# Patient Record
Sex: FEMALE | Race: WHITE | NOT HISPANIC OR LATINO
[De-identification: names, ages, dates, MRNs, and addresses within clinical notes are randomized per-mention and may not be internally consistent; named-entity substitution may affect disease eponyms.]

---

## 2022-03-07 ENCOUNTER — RESULT REVIEW (OUTPATIENT)
Age: 72
End: 2022-03-07

## 2022-04-07 ENCOUNTER — APPOINTMENT (OUTPATIENT)
Dept: ORTHOPEDIC SURGERY | Facility: CLINIC | Age: 72
End: 2022-04-07
Payer: MEDICARE

## 2022-04-07 VITALS — BODY MASS INDEX: 41.54 KG/M2 | HEIGHT: 61 IN | WEIGHT: 220 LBS

## 2022-04-07 PROCEDURE — 99024 POSTOP FOLLOW-UP VISIT: CPT

## 2022-04-07 NOTE — PHYSICAL EXAM
[Normal Coordination] : normal coordination [Normal Sensation] : normal sensation [Normal Mood and Affect] : normal mood and affect [Orientated] : orientated [Normal Skin] : normal skin [Well Developed] : well developed [Peripheral vascular exam is grossly normal] : peripheral vascular exam is grossly normal [Left] : left shoulder [Standing] : standing [Mild] : mild [4 ___] : forward flexion 4[unfilled]/5 [4___] : internal rotation 4[unfilled]/5 [] : no tenderness at lateral shoulder [FreeTextEntry3] : Single small suture spitting from incision. Removed easily. No Active drainage. [TWNoteComboBox7] : active forward flexion 60 degrees [de-identified] : active abduction 90 degrees

## 2022-04-07 NOTE — HISTORY OF PRESENT ILLNESS
[de-identified] : The patient is s/p one month from surgery. She is doing well postoperatively. She denies new trauma. She reports a small suture spitting at incision without any drainage or redness. She denies fever, chills, SOB, CP. The patient can reach to shoulder height in FE and ABD. She will d/c her brace altogether at this time. She denies paresthesias. The patient will begin PT.

## 2022-04-07 NOTE — DISCUSSION/SUMMARY
[de-identified] : The patient is s/p left shoulder reverse total shoulder replacement with proximal biceps tenodesis\par on 3/7/22.\par \par The patient is doing well postop. \par She denies new trauma. \par She will d/c brace full time. \par The patient will begin PT and was given an rx.\par She will follow up in 4 weeks with Dr. Oconnor.

## 2022-04-07 NOTE — REASON FOR VISIT
[FreeTextEntry2] : The patient is s/p left shoulder reverse total shoulder replacement with proximal biceps tenodesis\par on 3/7/22.

## 2022-04-25 ENCOUNTER — APPOINTMENT (OUTPATIENT)
Dept: ORTHOPEDIC SURGERY | Facility: CLINIC | Age: 72
End: 2022-04-25
Payer: MEDICARE

## 2022-04-25 VITALS — WEIGHT: 225 LBS | HEIGHT: 60 IN | BODY MASS INDEX: 44.17 KG/M2

## 2022-04-25 DIAGNOSIS — M17.12 UNILATERAL PRIMARY OSTEOARTHRITIS, LEFT KNEE: ICD-10-CM

## 2022-04-25 DIAGNOSIS — M17.11 UNILATERAL PRIMARY OSTEOARTHRITIS, RIGHT KNEE: ICD-10-CM

## 2022-04-25 PROCEDURE — 99213 OFFICE O/P EST LOW 20 MIN: CPT | Mod: 25

## 2022-04-25 PROCEDURE — 20610 DRAIN/INJ JOINT/BURSA W/O US: CPT | Mod: 50

## 2022-04-25 NOTE — ASSESSMENT
[FreeTextEntry1] : 6/21/21: Xray of the left shoulder reveals mod GH OA with mod sized inferior humeral head spur.\par Xray of B/L knees reveals endstage medial OA\par Discussed CSI injection for the left shoulder today. \par Regarding the knee, Hx of knee OA, receives gel injections at outside facility, PT reports she has to wait until July until she can repeat series. Unaware of brand of gel injections, \par \par Euflexxa #3 B/L knees, patient tolerated well, post injection instructions given.\par \par Not voicing knee pain today, primarily with worsening left shoulder pain, Re-discussed OA in the left shoulder, and discussed consulting with Dr. Chaparro for possible surgical intervention. \par PT interested in CSI injection left shoulder, PT tolerated well. \par \par 1/10/22: Euflexxa #1 given today b/l knees, tolerated well. Post injection instructions. RTO one week to continue series.\par Scheduled end of February for TSA with Dr. Chaparro.\par \par 1/17/22: Euflexxa #2 given today b/l knees, tolerated well. Post injection instructions. RTO one week to continue series.\par \par 1/24/22: Euflexxa #3 given today b/l knees, tolerated well. Post injection instructions. RTO in 6 weeks if needed.\par \par 4/25/22: pervious series proved approx 7 months of relief. \par Latest series of visco with 4 months relief. \par Patient elected to proceed with steroid injection for b/l knees. \par Apply ice to affected area.\par Advised of duration btwn injections and TKA. \par R knee worse than left. \par \par \par \par

## 2022-04-25 NOTE — PROCEDURE
[Large Joint Injection] : Large joint injection [Bilateral] : bilaterally of the [Knee] : knee [Pain] : pain [X-ray evidence of Osteoarthritis on this or prior visit] : x-ray evidence of Osteoarthritis on this or prior visit [Alcohol] : alcohol [Betadine] : betadine [Ethyl Chloride sprayed topically] : ethyl chloride sprayed topically [Sterile technique used] : sterile technique used [___ cc    6mg] :  Betamethasone (Celestone) ~Vcc of 6mg [___ cc    1%] : Lidocaine ~Vcc of 1%  [] : Patient tolerated procedure well [Call if redness, pain or fever occur] : call if redness, pain or fever occur [Apply ice for 15min out of every hour for the next 12-24 hours as tolerated] : apply ice for 15 minutes out of every hour for the next 12-24 hours as tolerated [Patient was advised to rest the joint(s) for ____ days] : patient was advised to rest the joint(s) for [unfilled] days [Previous OTC use and PT nontherapeutic] : patient has tried OTC's including aspirin, Ibuprofen, Aleve, etc or prescription NSAIDS, and/or exercises at home and/or physical therapy without satisfactory response [Patient had decreased mobility in the joint] : patient had decreased mobility in the joint [Risks, benefits, alternatives discussed / Verbal consent obtained] : the risks benefits, and alternatives have been discussed, and verbal consent was obtained

## 2022-04-25 NOTE — HISTORY OF PRESENT ILLNESS
[10] : 10 [8] : 8 [Tightness] : tightness [Rest] : rest [Walking] : walking [Retired] : Work status: retired [de-identified] : last note: 1/24/22: Euflexxa #3 bilateral knees.\par 1/17/22 2nd euflexxa injection B/L knees\par 10/4/21: PT present to f/u on the left shoulder. PT reports on Tylenol arthritis that does help with pain, PT reports\par posterior and anterior shoulder pain. PT reports last CSI injections provided relief for a few days, PT is interested in\par repeating the injection.\par 7/26/21: PT present for 3rd B/L knees EUFLEXXA.\par 7/19/21: PT present for 2nd B/L EUFLEXXA.\par 7/12/21: PT present to f/u on B/L knees, PT reports she obtained the name of brand of series for gel injection\par 06/21/2021: 69YO female patient present for left shoulder pain, and limited ROM. PT reports unable to comb her hair, PT\par reports her rheumatologist advised her of shoulder OA. PT reports nocturnal awakening and difficulty sleeping. Hx of B/L\par knee OA.\par 6 Injection: [] : no [FreeTextEntry1] : bilat knee ; right worse than the left [FreeTextEntry9] : voltaren [de-identified] : has had injections in the past, last series wore off after 4 months

## 2022-04-25 NOTE — PHYSICAL EXAM
[Orientated] : orientated [Able to Communicate] : able to communicate [Normal Skin] : normal skin [Bilateral] : knee bilaterally [NL (0)] : extension 0 degrees [] : no ecchymosis [TWNoteComboBox7] : flexion 120 degrees [de-identified] : extension 0 degrees

## 2022-04-25 NOTE — DISCUSSION/SUMMARY
[de-identified] : The documentation recorded by the scribe accurately reflects the service I personally performed and the decisions made by me\par I, Koffi Miller, attest that this documentation has been prepared under the direction and in the presence of Provider Porfirio Limon MD.\par \par The patient was seen by Porfirio Limon MD\par

## 2022-04-25 NOTE — REASON FOR VISIT
[FreeTextEntry2] : 4/25/22: 4 months relief with latest visco injections.Typically last for 7 months. Intersted in b/l steroid injection. follow up for knee pain

## 2022-05-05 ENCOUNTER — TRANSCRIPTION ENCOUNTER (OUTPATIENT)
Age: 72
End: 2022-05-05

## 2022-05-05 ENCOUNTER — APPOINTMENT (OUTPATIENT)
Dept: ORTHOPEDIC SURGERY | Facility: CLINIC | Age: 72
End: 2022-05-05
Payer: MEDICARE

## 2022-05-05 VITALS — HEIGHT: 60 IN | WEIGHT: 221 LBS | BODY MASS INDEX: 43.39 KG/M2

## 2022-05-05 DIAGNOSIS — M54.6 PAIN IN THORACIC SPINE: ICD-10-CM

## 2022-05-05 DIAGNOSIS — S22.050A WEDGE COMPRESSION FRACTURE OF T5-T6 VERTEBRA, INITIAL ENCOUNTER FOR CLOSED FRACTURE: ICD-10-CM

## 2022-05-05 DIAGNOSIS — M48.10 ANKYLOSING HYPEROSTOSIS [FORESTIER], SITE UNSPECIFIED: ICD-10-CM

## 2022-05-05 PROCEDURE — 99213 OFFICE O/P EST LOW 20 MIN: CPT | Mod: 24

## 2022-05-05 PROCEDURE — 72070 X-RAY EXAM THORAC SPINE 2VWS: CPT

## 2022-05-05 NOTE — REASON FOR VISIT
[Spouse] : spouse [FreeTextEntry2] : The patient is s/p left shoulder reverse total shoulder replacement with proximal biceps tenodesis\par on 3/7/22.

## 2022-05-05 NOTE — DISCUSSION/SUMMARY
[de-identified] : The patient is s/p left shoulder reverse total shoulder replacement with proximal biceps tenodesis\par on 3/7/22.\par \par The patient is doing well postop, despite recent fall.\par No damage to the shoulder is noted on x-rays since the fall\par The patient will proceed with PT for the left shoulder and to also include a balance assessment \par She will follow up in 4 weeks with Dr. Oconnor.\par \par For the thoracic spine:\par The patient has evidence of fracture of the thoracic spine T5.\par She was prescribed a stand up MRI of the thoracic spine to rule out spine fracture\par She was referred to Dr. Lynn to review the MRI and proceed with indicated treatment for the spine\par

## 2022-05-05 NOTE — PHYSICAL EXAM
[Normal Coordination] : normal coordination [Normal Sensation] : normal sensation [Normal Mood and Affect] : normal mood and affect [Orientated] : orientated [Normal Skin] : normal skin [Well Developed] : well developed [Peripheral vascular exam is grossly normal] : peripheral vascular exam is grossly normal [Standing] : standing [Mild] : mild [4 ___] : forward flexion 4[unfilled]/5 [4___] : internal rotation 4[unfilled]/5 [Left] : left shoulder [Bilateral] : rib bilaterally [FreeTextEntry8] : midline thoracic back pain [FreeTextEntry9] : not assessed secondary to pain and recent injury [] : no tenderness at lateral shoulder [FreeTextEntry3] : Single small suture spitting from incision. Removed easily. No Active drainage. [FreeTextEntry1] : city MD 5/3/22: TSR hardware intact. no fracture in humerus or glenoid. concentrically reduced shoulder.  [TWNoteComboBox7] : active forward flexion 60 degrees [de-identified] : active abduction 90 degrees

## 2022-05-05 NOTE — HISTORY OF PRESENT ILLNESS
[de-identified] : The patient is s/p 2 months from surgery. She is doing well postoperatively for her shoulder, she has weakness of the shoulder.  The patient has had 2 recent falls. Tuesday she fell going into her house, she tripped over her cane. She did not injury the left shoulder, she hurt her thoracic spine. She was seen at urgent care and diagnosed with bruised ribs.  Last week she tripped on a runner and fell striking her head. She had a proximal suture that fell off and had a superficial suture abscess. It was treated with antibiotic ointment. She denies fever, chills, SOB, CP. No drainage from the surgical site or from the superficial abscess. She denies paresthesias. She was unable to go to PT yesterday due to pain from her recent fall.

## 2022-05-12 RX ORDER — TRAMADOL HYDROCHLORIDE 50 MG/1
50 TABLET, COATED ORAL TWICE DAILY
Qty: 10 | Refills: 0 | Status: ACTIVE | COMMUNITY
Start: 2022-05-12 | End: 1900-01-01

## 2022-06-23 ENCOUNTER — APPOINTMENT (OUTPATIENT)
Dept: ORTHOPEDIC SURGERY | Facility: CLINIC | Age: 72
End: 2022-06-23
Payer: MEDICARE

## 2022-06-23 PROCEDURE — 99213 OFFICE O/P EST LOW 20 MIN: CPT

## 2022-06-23 NOTE — PHYSICAL EXAM
[4___] : external rotation 4[unfilled]/5 [] : motor and sensory intact distally [Left] : left shoulder [de-identified] : Constitutional: The general appearance of the patient is well developed, well nourished, no deformities and well groomed.Normal\par  \par Gait: Gait and function is as follows:normal gait. \par  \par Skin: Head and neck visualized skin is normal.Left upper extremity visualized skin is normal.Right upper extremity visualized skin is normal.\par  \par Cardiovascular: palpable radial pulse bilaterally. \par  \par Neurologic: The patient is oriented to time, place and person.Sensation to light touch intact in the bilateral upper extremities.Mood and Affect is normal. \par \par  [FreeTextEntry3] : Patient has numbness around surgical incision. [FreeTextEntry8] : no TTP on spine of scapula or acromion. [TWNoteComboBox7] : active forward flexion 100 degrees [de-identified] : active abduction 60 degrees [TWNoteComboBox6] : internal rotation L5 [de-identified] : external rotation 20 degrees

## 2022-06-23 NOTE — DISCUSSION/SUMMARY
[de-identified] : The patient is s/p left shoulder reverse total shoulder replacement with proximal biceps tenodesis\par on 3/7/22.\par \par Patient is doing well overall.\par Patient will complete supervised PT and transition to HEP.\par She may proceed with activities of daily living as tolerated.\par Patient will follow up as needed.\par \par \par \par I, Dr. Hermann Oconnor, attest that this documentation was recorded by the scribe, in my presence, and that it accurately reflects the service I personally performed, and the decisions made by me with my edits as appropriate.  \par \par I, Sky Moulton, acting as scribe, attest that this documentation has been prepared under the direction and in the presence of Provider Hermann Oconnor MD.\par

## 2022-06-23 NOTE — HISTORY OF PRESENT ILLNESS
[de-identified] : Patient reports the left shoulder feels better. Patient denies new injuries or trauma. Patient denies fevers, chills, or drainage. Patient was not doing PT but will start again today. She reports function is better. Patient is not taking any medications for the shoulder at this time.

## 2022-11-06 ENCOUNTER — FORM ENCOUNTER (OUTPATIENT)
Age: 72
End: 2022-11-06

## 2023-01-18 ENCOUNTER — APPOINTMENT (OUTPATIENT)
Dept: ORTHOPEDIC SURGERY | Facility: CLINIC | Age: 73
End: 2023-01-18
Payer: MEDICARE

## 2023-01-18 VITALS — BODY MASS INDEX: 43.39 KG/M2 | HEIGHT: 60 IN | WEIGHT: 221 LBS

## 2023-01-18 PROCEDURE — 99214 OFFICE O/P EST MOD 30 MIN: CPT

## 2023-01-18 PROCEDURE — 73030 X-RAY EXAM OF SHOULDER: CPT | Mod: LT

## 2023-01-18 PROCEDURE — A4565: CPT

## 2023-01-18 NOTE — HISTORY OF PRESENT ILLNESS
[de-identified] : 71 y/o female who underwent left reverse total shoulder arthroplasty on 3/7/22 with Dr. Oconnor. She reports excellent recovery and had excellent ROM with minimal pain. Patient admit to recent fall last Friday and landed on her shoulder. She has had severe pain since the fall. ROM is limited secondary to the fall. Patient was treated at [facility] with negative x-rays. Pain is noted to the superior aspect of the shoulder.

## 2023-01-18 NOTE — PHYSICAL EXAM
[Left] : left shoulder [There are no fractures, subluxations or dislocations. No significant abnormalities are seen] : There are no fractures, subluxations or dislocations. No significant abnormalities are seen [Components well fixed, in good position] : Components well fixed, in good position [de-identified] : Constitutional: The general appearance of the patient is well developed, well nourished, no deformities and well groomed. Normal \par \par Gait: Gait and function is as follows: ambulates with cane\par \par Skin: Head and neck visualized skin is normal. Left upper extremity visualized skin is normal. Right upper extremity visualized skin is normal. Thoracic Skin of the thoracic spine shows visualized skin is normal. \par \par Cardiovascular: palpable radial pulse bilaterally, good capillary refill in digits of the bilateral upper extremities and no temperature or color changes in the bilateral upper extremities. \par \par Lymphatic: Normal Palpation of lymph nodes in the cervical. \par \par Neurologic: fine motor control in the bilateral upper extremities is intact. Deep Tendon Reflexes in Upper and Lower Extremities Negative Werner's in the bilateral upper extremities. The patient is oriented to time, place and person. Sensation to light touch intact in the bilateral upper extremities. Mood and Affect is normal. \par \par Right Shoulder: Inspection of the shoulder/upper arm is as follows: There is a full, pain-free, stable range of motion of the shoulder with normal strength and no tenderness to palpation.  \par  \par \par Left Shoulder: Inspection of the shoulder/upper arm is as follows: Stable shoulder. Palpation of the shoulder/upper arm is as follows: There is tenderness at the AC joint, scapula. Range of motion of the shoulder is as follows: Limited ROM s/p recent fall. Pain with internal rotation, external rotation, abduction and forward flexion. Strength of the shoulder is as follows:  Deltoid 5/5 Ligament Stability and Special Tests of the shoulder is as follows: stable shoulder\par \par Neck: Inspection / Palpation of the cervical spine is as follows: There is a mild restricted range of motion of the cervical spine. Increase tone and mild tenderness to palpation. Stable ROM of cervical spine. \par \par Back, including spine: Inspection / Palpation of the thoracic/lumbar spine is as follows: There is a full, pain free, stable range of motion of the thoracic spine with a normal tone and not tenderness to palpation.. \par \par

## 2023-01-18 NOTE — DISCUSSION/SUMMARY
[de-identified] : 73 y/o female who underwent left reverse total shoulder arthroplasty on 3/7/22 with Dr. Oconnor. She reports excellent recovery and had excellent ROM with minimal pain. Patient admit to recent fall last Friday and landed on her shoulder.\par X-rays reviewed with the patient today demonstrate well fixed components and no fractures noted.\par Patient received a sling to wear for 2 weeks to support the shoulder\par Patient was advised to apply Voltaren gel and ice on the affected area\par Likely AC sprain Grade 2\par If the patients pain does not improve in 2 weeks we could consider an MRI at her next visit\par \par \par (1) We discussed a comprehensive treatment plans that included a prescription management plan involving the use of prescription strength medications to include Ibuprofen 600- 800 mg TID, versus 500- 650 mg Tylenol. We also discussed prescribing topical diclofenac (Voltaren gel) as well as once daily Meloxicam 15 mg.\par \par (2) The patient has More Than One chronic injuries/illnesses as outlined, discussed, and documented by ICD 10 codes listed, as well as the HPI and Plan section.\par \par There is a moderate risk of morbidity with further treatment, especially from use of prescription strength medications and possible side effects of these medications which include upset stomach and cardiac/renal issues with long term use were discussed.\par \par (3) I recommended that the patient follow-up with their medical physician to discuss any significant specific potential issues with long term use such as interactions with current medications or with exacerbation of underlying medical morbidities.\par \par Attestation:\par \par I, Sky Kenney, attest that this documentation has been prepared under the direction and in the presence of Provider Baldo Branham MD.\par \par The documentation recorded by the scribe, in my presence, accurately reflects the service I personally performed, and the decisions made by me with my edits as appropriate.\par \par Baldo Branham MD\par

## 2023-02-01 ENCOUNTER — APPOINTMENT (OUTPATIENT)
Dept: ORTHOPEDIC SURGERY | Facility: CLINIC | Age: 73
End: 2023-02-01
Payer: MEDICARE

## 2023-02-01 PROCEDURE — 99214 OFFICE O/P EST MOD 30 MIN: CPT

## 2023-02-01 NOTE — HISTORY OF PRESENT ILLNESS
[de-identified] : 71 y/o female who underwent left reverse total shoulder arthroplasty on 3/7/22 with Dr. Oconnor. She reports excellent recovery and had excellent ROM with minimal pain. Patient admit to recent fall last Friday and landed on her shoulder. She has had severe pain since the fall. ROM is limited secondary to the fall. Patient was treated at [facility] with negative x-rays. Pain is noted to the superior aspect of the shoulder. \par 2/1/23: Patient presents today for repeat evaluation of left shoulder pain status post fall that occurred 2 weeks ago.  She has noticed less discomfort to the superior aspect of her shoulder.  Range of motion has slowly began to improve.  Patient has been applying topical Voltaren gel and ice.

## 2023-02-01 NOTE — PHYSICAL EXAM
[de-identified] : Constitutional: The general appearance of the patient is well developed, well nourished, no deformities and well groomed. Normal \par \par Gait: Gait and function is as follows: ambulates with cane\par \par Skin: Head and neck visualized skin is normal. Left upper extremity visualized skin is normal. Right upper extremity visualized skin is normal. Thoracic Skin of the thoracic spine shows visualized skin is normal. \par \par Cardiovascular: palpable radial pulse bilaterally, good capillary refill in digits of the bilateral upper extremities and no temperature or color changes in the bilateral upper extremities. \par \par Lymphatic: Normal Palpation of lymph nodes in the cervical. \par \par Neurologic: fine motor control in the bilateral upper extremities is intact. Deep Tendon Reflexes in Upper and Lower Extremities Negative Werner's in the bilateral upper extremities. The patient is oriented to time, place and person. Sensation to light touch intact in the bilateral upper extremities. Mood and Affect is normal. \par \par Right Shoulder: Inspection of the shoulder/upper arm is as follows: There is a full, pain-free, stable range of motion of the shoulder with normal strength and no tenderness to palpation.  \par  \par \par Left Shoulder: Inspection of the shoulder/upper arm is as follows: Stable shoulder. Palpation of the shoulder/upper arm is as follows: There is tenderness at the AC joint, scapula. Range of motion of the shoulder is as follows: Limited ROM s/p recent fall. Pain with internal rotation, external rotation, abduction and forward flexion. Strength of the shoulder is as follows:  Deltoid 5/5 Ligament Stability and Special Tests of the shoulder is as follows: stable shoulder\par \par Neck: Inspection / Palpation of the cervical spine is as follows: There is a mild restricted range of motion of the cervical spine. Increase tone and mild tenderness to palpation. Stable ROM of cervical spine. \par \par Back, including spine: Inspection / Palpation of the thoracic/lumbar spine is as follows: There is a full, pain free, stable range of motion of the thoracic spine with a normal tone and not tenderness to palpation.. \par \par

## 2023-02-01 NOTE — DISCUSSION/SUMMARY
[de-identified] : 73 y/o female who underwent left reverse total shoulder arthroplasty on 3/7/22 with Dr. Oconnor. \par Patient experienced mild set back from a slip and fall 2 weeks ago. \par exam was consistent with likely AC sprain. \par Patient states her ROM is improving.  \par She was provided new sling to use for comfort. \par Recommended to continue with topical cream for pain relief. \par \par She will follow up in 4 weeks. Consider MRI if pain/rom regresses,\par \par \par (1) We discussed a comprehensive treatment plans that included a prescription management plan involving the use of prescription strength medications to include Ibuprofen 600- 800 mg TID, versus 500- 650 mg Tylenol. We also discussed prescribing topical diclofenac (Voltaren gel) as well as once daily Meloxicam 15 mg.\par \par (2) The patient has More Than One chronic injuries/illnesses as outlined, discussed, and documented by ICD 10 codes listed, as well as the HPI and Plan section.\par \par There is a moderate risk of morbidity with further treatment, especially from use of prescription strength medications and possible side effects of these medications which include upset stomach and cardiac/renal issues with long term use were discussed.\par \par (3) I recommended that the patient follow-up with their medical physician to discuss any significant specific potential issues with long term use such as interactions with current medications or with exacerbation of underlying medical morbidities.\par \par Patient seen by Adrian Moeller PA-C under the direct supervision of Baldo Branham M.D.\par \par

## 2023-03-01 ENCOUNTER — APPOINTMENT (OUTPATIENT)
Dept: ORTHOPEDIC SURGERY | Facility: CLINIC | Age: 73
End: 2023-03-01
Payer: MEDICARE

## 2023-03-01 PROCEDURE — 20611 DRAIN/INJ JOINT/BURSA W/US: CPT | Mod: RT

## 2023-03-01 PROCEDURE — 73030 X-RAY EXAM OF SHOULDER: CPT | Mod: RT

## 2023-03-01 PROCEDURE — 99214 OFFICE O/P EST MOD 30 MIN: CPT | Mod: 25

## 2023-03-01 NOTE — PHYSICAL EXAM
[Right] : right shoulder [Glenohumeral arthritis] : Glenohumeral arthritis [de-identified] : Constitutional: The general appearance of the patient is well developed, well nourished, no deformities and well groomed. Normal \par \par Gait: Gait and function is as follows: ambulates with cane\par \par Skin: Head and neck visualized skin is normal. Left upper extremity visualized skin is normal. Right upper extremity visualized skin is normal. Thoracic Skin of the thoracic spine shows visualized skin is normal. \par \par Cardiovascular: palpable radial pulse bilaterally, good capillary refill in digits of the bilateral upper extremities and no temperature or color changes in the bilateral upper extremities. \par \par Lymphatic: Normal Palpation of lymph nodes in the cervical. \par \par Neurologic: fine motor control in the bilateral upper extremities is intact. Deep Tendon Reflexes in Upper and Lower Extremities Negative Werner's in the bilateral upper extremities. The patient is oriented to time, place and person. Sensation to light touch intact in the bilateral upper extremities. Mood and Affect is normal. \par \par Right Shoulder: Inspection of the shoulder/upper arm is as follows: There is a full, pain-free, stable range of motion of the shoulder with normal strength and no tenderness to palpation.  \par  \par \par Left Shoulder: Inspection of the shoulder/upper arm is as follows: Stable shoulder. Palpation of the shoulder/upper arm is as follows: There is tenderness at the AC joint, scapula. Range of motion of the shoulder is as follows: Limited ROM s/p recent fall. Pain with internal rotation, external rotation, abduction and forward flexion. Strength of the shoulder is as follows:  Deltoid 5/5 Ligament Stability and Special Tests of the shoulder is as follows: stable shoulder\par \par Neck: Inspection / Palpation of the cervical spine is as follows: There is a mild restricted range of motion of the cervical spine. Increase tone and mild tenderness to palpation. Stable ROM of cervical spine. \par \par Back, including spine: Inspection / Palpation of the thoracic/lumbar spine is as follows: There is a full, pain free, stable range of motion of the thoracic spine with a normal tone and not tenderness to palpation.. \par \par

## 2023-03-01 NOTE — HISTORY OF PRESENT ILLNESS
[de-identified] : 73 y/o female who underwent left reverse total shoulder arthroplasty on 3/7/22 with Dr. Oconnor. She reports excellent recovery and had excellent ROM with minimal pain. Patient admit to recent fall last Friday and landed on her shoulder. She has had severe pain since the fall. ROM is limited secondary to the fall. Patient was treated at [facility] with negative x-rays. Pain is noted to the superior aspect of the shoulder. \par 2/1/23: Patient presents today for repeat evaluation of left shoulder pain status post fall that occurred 2 weeks ago.  She has noticed less discomfort to the superior aspect of her shoulder.  Range of motion has slowly began to improve.  Patient has been applying topical Voltaren gel and ice.\par 3/1/23: 73 y/o F presenting for a follow up eval of left shoulder pain s/p fall 6 weeks ago. She states she is improved since last visit, but not back to baseline. Chief complaint is left shoulder pain in the posterior aspect, but tolerable. ROM is gradually improving. She reports recent increased right shoulder pain (diffuse, but most prominent in the anterior region). Episodic radiation of the right shoulder. 4/3/23 - upcoming knee surgery

## 2023-03-01 NOTE — DISCUSSION/SUMMARY
[de-identified] : 73 y/o female who underwent left reverse total shoulder arthroplasty on 3/7/22 with Dr. Oconnor. \par Patient experienced mild set back from a slip and fall 6 weeks ago. \par exam was consistent with likely AC sprain. \par Patient states her ROM is improving and overall pain level.\par Demonstrated sleeper stretch. \par Recommended to continue with topical cream for pain relief. \par Consider MRI if pain/rom regresses. \par \par In regards to the right shoulder:\par X-RAY rt shoulder reviewed with the patient demonstrates mild-moderate GHOA. \par Patient elects to receive subacromial injection today in office for pain relief. \par consider GHJI vs orthovisc..will eventually need Reverse TSA\par \par She will follow up a couple weeks after upcoming knee surgery on 04/1/23. \par \par (1) We discussed a comprehensive treatment plans that included a prescription management plan involving the use of prescription strength medications to include Ibuprofen 600-800 mg TID, versus 500-650 mg Tylenol. We also discussed prescribing topical diclofenac (Voltaren gel) as well as once daily Meloxicam 15 mg.\par (2) The patient has More Than One chronic injuries/illnesses as outlined, discussed, and documented by ICD 10 codes listed, as well as the HPI and Plan section.\par There is a moderate risk of morbidity with further treatment, especially from use of prescription strength medications and possible side effects of these medications which include upset stomach and cardiac/renal issues with long term use were discussed.\par (3) I recommended that the patient follow-up with their medical physician to discuss any significant specific potential issues with long term use such as interactions with current medications or with exacerbation of underlying medical morbidities. \par \par Attestation:\par I, Stephy Chopra, attest that this documentation has been prepared under the direction and in the presence of Provider Baldo Branham MD.\par The documentation recorded by the scribe, in my presence, accurately reflects the service I personally performed, and the decisions made by me with my edits as appropriate.\par Baldo Branham MD\par

## 2023-03-01 NOTE — PROCEDURE
[FreeTextEntry3] : Large Joint Injection was performed because of pain and inflammation. \par Anesthesia: ethyl chloride sprayed topically.. \par Depomedrol: An injection of Depomedrol 40 mg , 1 cc. \par Lidocaine: 7 cc. \par \par Medication was injected in the right subacromial space. Patient has tried OTC's including aspirin, Ibuprofen, Aleve etc or prescription NSAIDS, and/or exercises at home and/ or physical therapy without satisfactory response and Patient has decreased mobility in the joint. After verbal consent using sterile preparation and technique. The risks, benefits, and alternatives to cortisone injection were explained in full to the patient. Risks outlined include but are not limited to infection, sepsis, bleeding, scarring, skin discoloration, temporary increase in pain, syncopal episode, failure to resolve symptoms, allergic reaction, symptom recurrence, and elevation of blood sugar in diabetics. Patient understood the risks. All questions were answered. After discussion of options, patient requested an injection. Oral informed consent was obtained and sterile prep was done of the injection site. Sterile technique was utilized for the procedure including the preparation of the solutions used for the injection. Patient tolerated the procedure well. Advised to ice the injection site this evening. Prep with alcohol locally to site. Sterile technique used. Patient tolerated procedure well. Post Procedure Instructions: Patient was advised to call if redness, pain, or fever occur and apply ice for 15 min. out of every hour for the next 12-24 hours as tolerated. patient was advised to rest the joint(s) for 7 days. \par Ultrasound Guidance was used for the following reasons: prior failure or difficult injection. \par \par Ultrasound guided injection was performed of the shoulder, visualization of the needle and placement of injection was performed without complication.\par \par \par

## 2023-05-10 ENCOUNTER — APPOINTMENT (OUTPATIENT)
Dept: ORTHOPEDIC SURGERY | Facility: CLINIC | Age: 73
End: 2023-05-10
Payer: MEDICARE

## 2023-05-10 PROCEDURE — 99214 OFFICE O/P EST MOD 30 MIN: CPT | Mod: 25

## 2023-05-10 PROCEDURE — 20611 DRAIN/INJ JOINT/BURSA W/US: CPT | Mod: RT

## 2023-05-10 NOTE — PHYSICAL EXAM
[de-identified] : Constitutional: The general appearance of the patient is well developed, well nourished, no deformities and well groomed. Normal \par \par Gait: Gait and function is as follows: ambulates with cane\par \par Skin: Head and neck visualized skin is normal. Left upper extremity visualized skin is normal. Right upper extremity visualized skin is normal. Thoracic Skin of the thoracic spine shows visualized skin is normal. \par \par Cardiovascular: palpable radial pulse bilaterally, good capillary refill in digits of the bilateral upper extremities and no temperature or color changes in the bilateral upper extremities. \par \par Lymphatic: Normal Palpation of lymph nodes in the cervical. \par \par Neurologic: fine motor control in the bilateral upper extremities is intact. Deep Tendon Reflexes in Upper and Lower Extremities Negative Werner's in the bilateral upper extremities. The patient is oriented to time, place and person. Sensation to light touch intact in the bilateral upper extremities. Mood and Affect is normal. \par \par Right Shoulder: Inspection of the shoulder/upper arm is as follows: There is a full, pain-free, stable range of motion of the shoulder with normal strength and no tenderness to palpation.  \par  \par \par Left Shoulder: Inspection of the shoulder/upper arm is as follows: Stable shoulder. Palpation of the shoulder/upper arm is as follows: There is tenderness at the AC joint, scapula. Range of motion of the shoulder is as follows: Limited ROM s/p recent fall. Pain with internal rotation, external rotation, abduction and forward flexion. Strength of the shoulder is as follows:  Deltoid 5/5 Ligament Stability and Special Tests of the shoulder is as follows: stable shoulder\par \par Neck: Inspection / Palpation of the cervical spine is as follows: There is a mild restricted range of motion of the cervical spine. Increase tone and mild tenderness to palpation. Stable ROM of cervical spine. \par \par Back, including spine: Inspection / Palpation of the thoracic/lumbar spine is as follows: There is a full, pain free, stable range of motion of the thoracic spine with a normal tone and not tenderness to palpation.. \par \par

## 2023-05-10 NOTE — PROCEDURE
[Right] : of the right [Glenohumeral Joint] : glenohumeral joint [Pain] : pain [Inflammation] : inflammation [X-ray evidence of Osteoarthritis on this or prior visit] : x-ray evidence of Osteoarthritis on this or prior visit [Alcohol] : alcohol [Orthovisc (30mg)] : 30mg of Orthovisc [#1] : series #1 [Call if redness, pain or fever occur] : call if redness, pain or fever occur [Apply ice for 15min out of every hour for the next 12-24 hours as tolerated] : apply ice for 15 minutes out of every hour for the next 12-24 hours as tolerated [Patient was advised to rest the joint(s) for ____ days] : patient was advised to rest the joint(s) for [unfilled] days [Previous OTC use and PT nontherapeutic] : patient has tried OTC's including aspirin, Ibuprofen, Aleve, etc or prescription NSAIDS, and/or exercises at home and/or physical therapy without satisfactory response [Patient had decreased mobility in the joint] : patient had decreased mobility in the joint [Risks, benefits, alternatives discussed / Verbal consent obtained] : the risks benefits, and alternatives have been discussed, and verbal consent was obtained [Glenohmeral injection] : glenohumeral injection [All ultrasound images have been permanently captured and stored accordingly in our picture archiving and communication system] : All ultrasound images have been permanently captured and stored accordingly in our picture archiving and communication system [Visualization of the needle and placement of injection was performed without complication] : visualization of the needle and placement of injection was performed without complication

## 2023-05-10 NOTE — DISCUSSION/SUMMARY
[de-identified] : 73 y/o female who underwent left reverse total shoulder arthroplasty on 3/7/22 with Dr. Oconnor. \par  Patient states her ROM is improving and overall pain level.\par Demonstrated sleeper stretch. \par Recommended to continue with topical cream for pain relief. \par Consider MRI if pain/rom regresses. \par \par In regards to the right shoulder:\par X-RAY rt shoulder reviewed with the patient demonstrates mild-moderate GHOA. \par Patient here for first Orthovisc injection to the right shoulder.\par ..will eventually need Reverse TSA\par \par \par \par (1) We discussed a comprehensive treatment plans that included a prescription management plan involving the use of prescription strength medications to include Ibuprofen 600-800 mg TID, versus 500-650 mg Tylenol. We also discussed prescribing topical diclofenac (Voltaren gel) as well as once daily Meloxicam 15 mg.\par (2) The patient has More Than One chronic injuries/illnesses as outlined, discussed, and documented by ICD 10 codes listed, as well as the HPI and Plan section.\par There is a moderate risk of morbidity with further treatment, especially from use of prescription strength medications and possible side effects of these medications which include upset stomach and cardiac/renal issues with long term use were discussed.\par (3) I recommended that the patient follow-up with their medical physician to discuss any significant specific potential issues with long term use such as interactions with current medications or with exacerbation of underlying medical morbidities. \par \par Attestation:\par I, Stephy Chopra, attest that this documentation has been prepared under the direction and in the presence of Provider Baldo Branham MD.\par The documentation recorded by the scribe, in my presence, accurately reflects the service I personally performed, and the decisions made by me with my edits as appropriate.\par Baldo Branham MD\par

## 2023-05-10 NOTE — HISTORY OF PRESENT ILLNESS
[de-identified] : 71 y/o female who underwent left reverse total shoulder arthroplasty on 3/7/22 with Dr. Oconnor. She reports excellent recovery and had excellent ROM with minimal pain. Patient admit to recent fall last Friday and landed on her shoulder. She has had severe pain since the fall. ROM is limited secondary to the fall. Patient was treated at [facility] with negative x-rays. Pain is noted to the superior aspect of the shoulder. \par 2/1/23: Patient presents today for repeat evaluation of left shoulder pain status post fall that occurred 2 weeks ago.  She has noticed less discomfort to the superior aspect of her shoulder.  Range of motion has slowly began to improve.  Patient has been applying topical Voltaren gel and ice.\par 3/1/23: 71 y/o F presenting for a follow up eval of left shoulder pain s/p fall 6 weeks ago. She states she is improved since last visit, but not back to baseline. Chief complaint is left shoulder pain in the posterior aspect, but tolerable. ROM is gradually improving. She reports recent increased right shoulder pain (diffuse, but most prominent in the anterior region). Episodic radiation of the right shoulder. 4/3/23 - upcoming knee surgery\par 05/10/23:Patient presenting for a follow up of left shoulder pain. Pt had minimal relief from subacromial inj. Wishes to proceed with orthvisc injections. \par

## 2023-05-17 ENCOUNTER — APPOINTMENT (OUTPATIENT)
Dept: ORTHOPEDIC SURGERY | Facility: CLINIC | Age: 73
End: 2023-05-17
Payer: MEDICARE

## 2023-05-17 DIAGNOSIS — S43.52XA SPRAIN OF LEFT ACROMIOCLAVICULAR JOINT, INITIAL ENCOUNTER: ICD-10-CM

## 2023-05-17 PROCEDURE — 20611 DRAIN/INJ JOINT/BURSA W/US: CPT | Mod: RT

## 2023-05-17 PROCEDURE — 99214 OFFICE O/P EST MOD 30 MIN: CPT | Mod: 25

## 2023-05-17 NOTE — HISTORY OF PRESENT ILLNESS
[de-identified] : 73 y/o female who underwent left reverse total shoulder arthroplasty on 3/7/22 with Dr. Oconnor. She reports excellent recovery and had excellent ROM with minimal pain. Patient admit to recent fall last Friday and landed on her shoulder. She has had severe pain since the fall. ROM is limited secondary to the fall. Patient was treated at [facility] with negative x-rays. Pain is noted to the superior aspect of the shoulder. \par 2/1/23: Patient presents today for repeat evaluation of left shoulder pain status post fall that occurred 2 weeks ago.  She has noticed less discomfort to the superior aspect of her shoulder.  Range of motion has slowly began to improve.  Patient has been applying topical Voltaren gel and ice.\par 3/1/23: 73 y/o F presenting for a follow up eval of left shoulder pain s/p fall 6 weeks ago. She states she is improved since last visit, but not back to baseline. Chief complaint is left shoulder pain in the posterior aspect, but tolerable. ROM is gradually improving. She reports recent increased right shoulder pain (diffuse, but most prominent in the anterior region). Episodic radiation of the right shoulder. 4/3/23 - upcoming knee surgery\par 05/10/23:Patient presenting for a follow up of left shoulder pain. Pt had minimal relief from subacromial inj. Wishes to proceed with orthvisc injections. \par 5/17/23: Pt here for a follow up of left shoulder pain. Pt presents for orthovisc #2. Pt had minimal relief from prior injection.

## 2023-05-17 NOTE — PROCEDURE
[Right] : of the right [Glenohumeral Joint] : glenohumeral joint [Pain] : pain [Inflammation] : inflammation [X-ray evidence of Osteoarthritis on this or prior visit] : x-ray evidence of Osteoarthritis on this or prior visit [Alcohol] : alcohol [Orthovisc (30mg)] : 30mg of Orthovisc [#1] : series #1 [Call if redness, pain or fever occur] : call if redness, pain or fever occur [Apply ice for 15min out of every hour for the next 12-24 hours as tolerated] : apply ice for 15 minutes out of every hour for the next 12-24 hours as tolerated [Patient was advised to rest the joint(s) for ____ days] : patient was advised to rest the joint(s) for [unfilled] days [Previous OTC use and PT nontherapeutic] : patient has tried OTC's including aspirin, Ibuprofen, Aleve, etc or prescription NSAIDS, and/or exercises at home and/or physical therapy without satisfactory response [Patient had decreased mobility in the joint] : patient had decreased mobility in the joint [Risks, benefits, alternatives discussed / Verbal consent obtained] : the risks benefits, and alternatives have been discussed, and verbal consent was obtained [Glenohmeral injection] : glenohumeral injection [All ultrasound images have been permanently captured and stored accordingly in our picture archiving and communication system] : All ultrasound images have been permanently captured and stored accordingly in our picture archiving and communication system [Visualization of the needle and placement of injection was performed without complication] : visualization of the needle and placement of injection was performed without complication [FreeTextEntry3] : After a long discussion was had with the patient outlining risks/benefits we decided to proceed with a series of orthovisc injections.\par \par States first injection provided little relief. Denies any negative reactions.\par \par Patient was treated today with injection #2. Tolerated the injection well.\par \par Advised to contact the office or presents to ER should they develop any swelling, erythema or fever.\par \par Patient will follow up in 1 week to discuss injection #3.\par \par

## 2023-05-17 NOTE — PHYSICAL EXAM
[de-identified] : Constitutional: The general appearance of the patient is well developed, well nourished, no deformities and well groomed. Normal \par \par Gait: Gait and function is as follows: ambulates with cane\par \par Skin: Head and neck visualized skin is normal. Left upper extremity visualized skin is normal. Right upper extremity visualized skin is normal. Thoracic Skin of the thoracic spine shows visualized skin is normal. \par \par Cardiovascular: palpable radial pulse bilaterally, good capillary refill in digits of the bilateral upper extremities and no temperature or color changes in the bilateral upper extremities. \par \par Lymphatic: Normal Palpation of lymph nodes in the cervical. \par \par Neurologic: fine motor control in the bilateral upper extremities is intact. Deep Tendon Reflexes in Upper and Lower Extremities Negative Werner's in the bilateral upper extremities. The patient is oriented to time, place and person. Sensation to light touch intact in the bilateral upper extremities. Mood and Affect is normal. \par \par Right Shoulder: Inspection of the shoulder/upper arm is as follows: There is a full, pain-free, stable range of motion of the shoulder with normal strength and no tenderness to palpation.  \par  \par \par Left Shoulder: Inspection of the shoulder/upper arm is as follows: Stable shoulder. Palpation of the shoulder/upper arm is as follows: There is tenderness at the AC joint, scapula. Range of motion of the shoulder is as follows: Limited ROM s/p recent fall. Pain with internal rotation, external rotation, abduction and forward flexion. Strength of the shoulder is as follows:  Deltoid 5/5 Ligament Stability and Special Tests of the shoulder is as follows: stable shoulder\par \par Neck: Inspection / Palpation of the cervical spine is as follows: There is a mild restricted range of motion of the cervical spine. Increase tone and mild tenderness to palpation. Stable ROM of cervical spine. \par \par Back, including spine: Inspection / Palpation of the thoracic/lumbar spine is as follows: There is a full, pain free, stable range of motion of the thoracic spine with a normal tone and not tenderness to palpation.. \par \par

## 2023-05-17 NOTE — DISCUSSION/SUMMARY
[de-identified] : 73 y/o female who underwent left reverse total shoulder arthroplasty on 3/7/22 with Dr. Oconnor. \par Patient states her ROM is improving and overall pain level.\par Recommended to continue with topical cream for pain relief. \par Consider MRI if pain/rom regresses. \par \par In regards to the right shoulder:\par X-RAY rt shoulder reviewed with the patient demonstrates mild-moderate GHOA. \par Patient here for first Orthovisc injection #2 to the right shoulder.\par ..will eventually need Reverse TSA\par Follow up in 1 week for orthovisc #3\par \par \par \par (1) We discussed a comprehensive treatment plans that included a prescription management plan involving the use of prescription strength medications to include Ibuprofen 600-800 mg TID, versus 500-650 mg Tylenol. We also discussed prescribing topical diclofenac (Voltaren gel) as well as once daily Meloxicam 15 mg.\par (2) The patient has More Than One chronic injuries/illnesses as outlined, discussed, and documented by ICD 10 codes listed, as well as the HPI and Plan section.\par There is a moderate risk of morbidity with further treatment, especially from use of prescription strength medications and possible side effects of these medications which include upset stomach and cardiac/renal issues with long term use were discussed.\par (3) I recommended that the patient follow-up with their medical physician to discuss any significant specific potential issues with long term use such as interactions with current medications or with exacerbation of underlying medical morbidities. \par \par Attestation:\par I, Christina Leung, attest that this documentation has been prepared under the direction and in the presence of Provider Baldo Branham MD.\par The documentation recorded by the scribe, in my presence, accurately reflects the service I personally performed, and the decisions made by me with my edits as appropriate.\par Baldo Branham MD\par

## 2023-05-24 ENCOUNTER — APPOINTMENT (OUTPATIENT)
Dept: ORTHOPEDIC SURGERY | Facility: CLINIC | Age: 73
End: 2023-05-24
Payer: MEDICARE

## 2023-05-24 DIAGNOSIS — M25.512 PAIN IN LEFT SHOULDER: ICD-10-CM

## 2023-05-24 PROCEDURE — 20611 DRAIN/INJ JOINT/BURSA W/US: CPT | Mod: LT

## 2023-05-24 PROCEDURE — 99214 OFFICE O/P EST MOD 30 MIN: CPT | Mod: 25

## 2023-05-24 RX ORDER — IBUPROFEN 800 MG/1
800 TABLET, FILM COATED ORAL 3 TIMES DAILY
Qty: 90 | Refills: 1 | Status: ACTIVE | COMMUNITY
Start: 2023-05-24 | End: 1900-01-01

## 2023-05-24 NOTE — REASON FOR VISIT
Refill per VO of Dr. Vinh Abad  Last appt: Visit date not found  Future Appointments   Date Time Provider Inge Jelly   3/31/2022  9:00 AM REMOTE1[facility] CAVSF BS AMB   6/15/2022 11:00 AM WTC DEXA/DEVANTE 1 [facility]   8/26/2022 11:00 AM ECHOTWOCHRISTOPHER BS AMB   8/26/2022 11:40 AM MD TULIO Doe BS AMB   9/16/2022 11:00 AM PACEMAKER, TAPAN ANTUNEZ BS AMB   9/16/2022 11:20 AM Claudette Monsalve NP [facility] BS AMB       Requested Prescriptions     Pending Prescriptions Disp Refills    metoprolol succinate (TOPROL-XL) 50 mg XL tablet [Pharmacy Med Name: METOPROL SUC TAB 50MG ER] 90 Tablet 0     Sig: TAKE 1 TABLET DAILY [FreeTextEntry2] : Left shoulder. \par

## 2023-05-24 NOTE — HISTORY OF PRESENT ILLNESS
[de-identified] : 71 y/o female who underwent left reverse total shoulder arthroplasty on 3/7/22 with Dr. Oconnor. She reports excellent recovery and had excellent ROM with minimal pain. Patient admit to recent fall last Friday and landed on her shoulder. She has had severe pain since the fall. ROM is limited secondary to the fall. Patient was treated at [facility] with negative x-rays. Pain is noted to the superior aspect of the shoulder. \par 2/1/23: Patient presents today for repeat evaluation of left shoulder pain status post fall that occurred 2 weeks ago.  She has noticed less discomfort to the superior aspect of her shoulder.  Range of motion has slowly began to improve.  Patient has been applying topical Voltaren gel and ice.\par 3/1/23: 71 y/o F presenting for a follow up eval of left shoulder pain s/p fall 6 weeks ago. She states she is improved since last visit, but not back to baseline. Chief complaint is left shoulder pain in the posterior aspect, but tolerable. ROM is gradually improving. She reports recent increased right shoulder pain (diffuse, but most prominent in the anterior region). Episodic radiation of the right shoulder. 4/3/23 - upcoming knee surgery\par 05/10/23:Patient presenting for a follow up of left shoulder pain. Pt had minimal relief from subacromial inj. Wishes to proceed with orthvisc injections. \par 5/17/23: Pt here for a follow up of left shoulder pain. Pt presents for orthovisc #2. Pt had minimal relief from prior injection.\par 5/24/23: Pt here for a follow up of left shoulder pain. Pt presents for orthovisc #3. Pt had minimal relief from prior injection.\par

## 2023-05-24 NOTE — PROCEDURE
[Right] : of the right [Glenohumeral Joint] : glenohumeral joint [Pain] : pain [Inflammation] : inflammation [X-ray evidence of Osteoarthritis on this or prior visit] : x-ray evidence of Osteoarthritis on this or prior visit [Alcohol] : alcohol [Orthovisc (30mg)] : 30mg of Orthovisc [#1] : series #1 [Call if redness, pain or fever occur] : call if redness, pain or fever occur [Apply ice for 15min out of every hour for the next 12-24 hours as tolerated] : apply ice for 15 minutes out of every hour for the next 12-24 hours as tolerated [Patient was advised to rest the joint(s) for ____ days] : patient was advised to rest the joint(s) for [unfilled] days [Previous OTC use and PT nontherapeutic] : patient has tried OTC's including aspirin, Ibuprofen, Aleve, etc or prescription NSAIDS, and/or exercises at home and/or physical therapy without satisfactory response [Patient had decreased mobility in the joint] : patient had decreased mobility in the joint [Risks, benefits, alternatives discussed / Verbal consent obtained] : the risks benefits, and alternatives have been discussed, and verbal consent was obtained [Glenohmeral injection] : glenohumeral injection [All ultrasound images have been permanently captured and stored accordingly in our picture archiving and communication system] : All ultrasound images have been permanently captured and stored accordingly in our picture archiving and communication system [Visualization of the needle and placement of injection was performed without complication] : visualization of the needle and placement of injection was performed without complication [FreeTextEntry3] : After a long discussion was had with the patient outlining risks/benefits we decided to proceed with a series of orthovisc injections.\par \par States first injection provided little relief. Denies any negative reactions.\par \par Patient was treated today with injection #3. Tolerated the injection well.\par \par Advised to contact the office or presents to ER should they develop any swelling, erythema or fever.\par \par \par

## 2023-05-24 NOTE — DISCUSSION/SUMMARY
[de-identified] : 73 y/o female who underwent left reverse total shoulder arthroplasty on 3/7/22 with Dr. Oconnor. \par Patient states her ROM is improving and overall pain level.\par Recommended to continue with topical cream for pain relief. \par Consider MRI if pain/rom regresses. \par \par In regards to the right shoulder:\par X-RAY rt shoulder reviewed with the patient demonstrates mild-moderate GHOA. \par Patient here for first Orthovisc injection #3 to the right shoulder.\par ..will eventually need Reverse TSA\par Follow up end of Nov for repeat injections\par \par \par \par (1) We discussed a comprehensive treatment plans that included a prescription management plan involving the use of prescription strength medications to include Ibuprofen 600-800 mg TID, versus 500-650 mg Tylenol. We also discussed prescribing topical diclofenac (Voltaren gel) as well as once daily Meloxicam 15 mg.\par (2) The patient has More Than One chronic injuries/illnesses as outlined, discussed, and documented by ICD 10 codes listed, as well as the HPI and Plan section.\par There is a moderate risk of morbidity with further treatment, especially from use of prescription strength medications and possible side effects of these medications which include upset stomach and cardiac/renal issues with long term use were discussed.\par (3) I recommended that the patient follow-up with their medical physician to discuss any significant specific potential issues with long term use such as interactions with current medications or with exacerbation of underlying medical morbidities. \par \par Attestation:\par I, Christina FREEMAN'Field, attest that this documentation has been prepared under the direction and in the presence of Provider Baldo Branham MD.\par The documentation recorded by the scribe, in my presence, accurately reflects the service I personally performed, and the decisions made by me with my edits as appropriate.\par Baldo Branham MD\par

## 2023-05-24 NOTE — PHYSICAL EXAM
[de-identified] : Constitutional: The general appearance of the patient is well developed, well nourished, no deformities and well groomed. Normal \par \par Gait: Gait and function is as follows: ambulates with cane\par \par Skin: Head and neck visualized skin is normal. Left upper extremity visualized skin is normal. Right upper extremity visualized skin is normal. Thoracic Skin of the thoracic spine shows visualized skin is normal. \par \par Cardiovascular: palpable radial pulse bilaterally, good capillary refill in digits of the bilateral upper extremities and no temperature or color changes in the bilateral upper extremities. \par \par Lymphatic: Normal Palpation of lymph nodes in the cervical. \par \par Neurologic: fine motor control in the bilateral upper extremities is intact. Deep Tendon Reflexes in Upper and Lower Extremities Negative Werner's in the bilateral upper extremities. The patient is oriented to time, place and person. Sensation to light touch intact in the bilateral upper extremities. Mood and Affect is normal. \par \par Right Shoulder: Inspection of the shoulder/upper arm is as follows: There is a full, pain-free, stable range of motion of the shoulder with normal strength and no tenderness to palpation.  \par  \par \par Left Shoulder: Inspection of the shoulder/upper arm is as follows: Stable shoulder. Palpation of the shoulder/upper arm is as follows: There is tenderness at the AC joint, scapula. Range of motion of the shoulder is as follows: Limited ROM s/p recent fall. Pain with internal rotation, external rotation, abduction and forward flexion. Strength of the shoulder is as follows:  Deltoid 5/5 Ligament Stability and Special Tests of the shoulder is as follows: stable shoulder\par \par Neck: Inspection / Palpation of the cervical spine is as follows: There is a mild restricted range of motion of the cervical spine. Increase tone and mild tenderness to palpation. Stable ROM of cervical spine. \par \par Back, including spine: Inspection / Palpation of the thoracic/lumbar spine is as follows: There is a full, pain free, stable range of motion of the thoracic spine with a normal tone and not tenderness to palpation.. \par \par

## 2023-07-19 ENCOUNTER — APPOINTMENT (OUTPATIENT)
Dept: ORTHOPEDIC SURGERY | Facility: CLINIC | Age: 73
End: 2023-07-19
Payer: MEDICARE

## 2023-07-19 PROCEDURE — 99214 OFFICE O/P EST MOD 30 MIN: CPT

## 2023-07-19 NOTE — DISCUSSION/SUMMARY
[de-identified] : 73 y/o female who underwent left reverse total shoulder arthroplasty on 3/7/22 with Dr. Oconnor. \par Patient states her ROM is improving and overall pain level.\par Recommended to continue with topical cream for pain relief. \par Consider MRI if pain/rom regresses. \par \par In regards to the right shoulder:\par Prior xray right shoulder reviewed with the patient demonstrates mild-moderate GHOA. \par Pt would like to proceed with right reverse TSA\par Physical therapy script given per impingement protocol \par Follow up pre op apt \par CT for surgical planning \par \par Pt has a significant osteoarthritis, with an intact deltoid, rotator cuff insufficiency, and inflammation to the biceps labral complex with proximal biceps tendinopathy. This has been present for more than 6 months and has not improved despite more than 3 months of conservative treatment including focused HEP/therapy, anti-inflammatory medication and activity modification. There have been no injections into the shoulder within 3 months of the indicated procedure. \par The patient is having severe pain with forward flexion and/or pseudoparalysis. \par The patient is interested in pursuing surgical treatment.\par We had a lengthy discussion about the surgery as well as the recovery. We discussed the risks which include but are not limited to infection, bleeding, need for blood transfusions, failure of treatment to alleviate all pain or improve function, the risk of injury to nerves and blood vessels.  There is also the risks inherent to anesthesia as well.  We discussed that given the patient’s distorted anatomy as a result of arthritis, these risks are real although every attempt will be made to mitigate these at the time of surgery.\par Pt understands there is no guarantee of success, however there is a high likelihood of functional improvement and pain relief. The patient understood all this was discussed.\par \par The patient is indicated for a Right reverse shoulder arthroplasty with biceps tenodesis.\par  \par * Surgery: Considering patient has failed all conservative treatment we are requesting authorization for:\par -	Right shoulder Reverse Shoulder Arthroplasty (CPT 58081) with Biceps Tenodesis (CPT 91915)\par Pt would like surgery (Oct/Nov)\par \par The patient will require a Robinson Arc 2.0 brace, cryotherapy, and 12 weeks of post-operative physical therapy.\par The patient will require a medical clearance , cardaic\par The doctor will require the Biomet representative, 2 hours, and one assistant.\par \par Comparison of Reverse and Anatomic Total Shoulder Arthroplasty in Patients With an Intact Rotator Cuff and No Previous Surgery\par Addy Monroe MD, [facility]; Lul Panda MD; Topher Lacy MD; Lewis Franklin; Adelina Cantrell; Carlton Greenwood MD; Jude Barfield MD; He Gautam MD; Jose Matthews MBA\par Author Information\par Journal of the American Academy of Orthopa[employer]geons 30(19):p 941-948, October 1, 2022. | DOI: [id number]\par \par Conclusion: \par At a mean of 41 month follow-up, primary aTSA and rTSA patients with OA and an intact rotator cuff with no previous history of shoulder surgery had similar clinical and radiographic outcomes. Greater external rotation was noted in aTSA patients at follow-up.\par  However, aTSA patients had a significantly greater rate of complications compared with rTSA patients.\par  rTSA is a viable treatment option in patients with an intact rotator cuff and no previous shoulder surgery, offering similar clinical outcomes with a lower complication rate. \par \par (1) We discussed a comprehensive treatment plans that included a prescription management plan involving the use of prescription strength medications to include Ibuprofen 600-800 mg TID, versus 500-650 mg Tylenol. We also discussed prescribing topical diclofenac (Voltaren gel) as well as once daily Meloxicam 15 mg.\par (2) The patient has More Than One chronic injuries/illnesses as outlined, discussed, and documented by ICD 10 codes listed, as well as the HPI and Plan section.\par There is a moderate risk of morbidity with further treatment, especially from use of prescription strength medications and possible side effects of these medications which include upset stomach and cardiac/renal issues with long term use were discussed.\par (3) I recommended that the patient follow-up with their medical physician to discuss any significant specific potential issues with long term use such as interactions with current medications or with exacerbation of underlying medical morbidities. \par \par Attestation:\par I, Christina O'Field, attest that this documentation has been prepared under the direction and in the presence of Provider Baldo Branham MD.\par The documentation recorded by the scribe, in my presence, accurately reflects the service I personally performed, and the decisions made by me with my edits as appropriate.\par Baldo Branham MD\par

## 2023-07-19 NOTE — HISTORY OF PRESENT ILLNESS
[de-identified] : 71 y/o female who underwent left reverse total shoulder arthroplasty on 3/7/22 with Dr. Oconnor. She reports excellent recovery and had excellent ROM with minimal pain. Patient admit to recent fall last Friday and landed on her shoulder. She has had severe pain since the fall. ROM is limited secondary to the fall. Patient was treated at [facility] with negative x-rays. Pain is noted to the superior aspect of the shoulder. \par 2/1/23: Patient presents today for repeat evaluation of left shoulder pain status post fall that occurred 2 weeks ago.  She has noticed less discomfort to the superior aspect of her shoulder.  Range of motion has slowly began to improve.  Patient has been applying topical Voltaren gel and ice.\par 3/1/23: 71 y/o F presenting for a follow up eval of left shoulder pain s/p fall 6 weeks ago. She states she is improved since last visit, but not back to baseline. Chief complaint is left shoulder pain in the posterior aspect, but tolerable. ROM is gradually improving. She reports recent increased right shoulder pain (diffuse, but most prominent in the anterior region). Episodic radiation of the right shoulder. 4/3/23 - upcoming knee surgery\par 05/10/23:Patient presenting for a follow up of left shoulder pain. Pt had minimal relief from subacromial inj. Wishes to proceed with orthvisc injections. \par \par 5/17/23: Pt here for a follow up of  right shoulder pain. Pt presents for orthovisc #2. Pt had minimal relief from prior injection.\par \par 5/24/23: Pt here for a follow up of right  shoulder pain. Pt presents for orthovisc #3. Pt had minimal relief from prior injection.\par \par 7/19/23: Patient here for right shoulder pain. Prior orthovisc injections provided no relief.  Pt would like to discuss definitive treatment. Pt has lymphedema in the right shoulder.  \par \par \par

## 2023-07-19 NOTE — PHYSICAL EXAM
[de-identified] : Constitutional: The general appearance of the patient is well developed, well nourished, no deformities and well groomed. Normal\par \par Gait: Gait and function is as follows: normal gait.\par \par Skin: Head and neck visualized skin is normal. Left upper extremity visualized skin is normal. Right upper extremity visualized skin is normal. Thoracic Skin of the thoracic spine shows visualized skin is normal.\par \par Cardiovascular: palpable radial pulse bilaterally, good capillary refill in digits of the bilateral upper extremities and no temperature or color changes in the bilateral upper extremities.\par \par Lymphatic: Normal Palpation of lymph nodes in the cervical.\par \par Neurologic: fine motor control in the bilateral upper extremities is intact. Deep Tendon Reflexes in Upper and Lower Extremities Negative Werner's in the bilateral upper extremities. The patient is oriented to time, place and person. Sensation to light touch intact in the bilateral upper extremities. Mood and Affect is normal.\par \par Right Shoulder: Inspection of the shoulder/upper arm is as follows: no scapula winging, no biceps deformity and no AC joint deformity. Palpation of the shoulder/upper arm is as follows: There is tenderness at the proximal biceps tendon. Range of motion of the shoulder is as follows: Pain with internal rotation, external rotation, abduction and forward flexion. Strength of the shoulder is as follows: Supraspinatus 4/5. External Rotation 4/5. Internal Rotation 4/5. Deltoid 5/5 Ligament Stability and Special Tests of the shoulder is as follows: Neer test is positive. Elmore' test is positive. Speed's test is positive\par \par Left Shoulder: Inspection of the shoulder/upper arm is as follows: There is a full, pain-free, stable range of motion of the shoulder with normal strength and no tenderness to palpation.\par \par Neck:\par \par Inspection / Palpation of the cervical spine is as follows: mild paracervical tenderness. Range of motion of the cervical spine is as follows: moderately decreased range of motion of the cervical spine. Stability testing for the cervical spine is as follows Stable range of motion.\par \par Back, including spine: Inspection / Palpation of the thoracic/lumbar spine is as follows: There is a full, pain free, stable range of motion of the thoracic spine with a normal tone and not tenderness to palpation..\par

## 2023-07-20 ENCOUNTER — RESULT REVIEW (OUTPATIENT)
Age: 73
End: 2023-07-20

## 2023-09-13 DIAGNOSIS — M75.41 IMPINGEMENT SYNDROME OF RIGHT SHOULDER: ICD-10-CM

## 2023-11-22 ENCOUNTER — APPOINTMENT (OUTPATIENT)
Dept: ORTHOPEDIC SURGERY | Facility: CLINIC | Age: 73
End: 2023-11-22
Payer: MEDICARE

## 2023-11-22 DIAGNOSIS — M75.111 INCOMPLETE ROTATOR CUFF TEAR OR RUPTURE OF RIGHT SHOULDER, NOT SPECIFIED AS TRAUMATIC: ICD-10-CM

## 2023-11-22 PROCEDURE — 99214 OFFICE O/P EST MOD 30 MIN: CPT

## 2023-11-22 PROCEDURE — L3960: CPT

## 2023-11-22 RX ORDER — ONDANSETRON 8 MG/1
8 TABLET, ORALLY DISINTEGRATING ORAL
Qty: 40 | Refills: 0 | Status: ACTIVE | COMMUNITY
Start: 2023-11-22 | End: 1900-01-01

## 2023-11-22 RX ORDER — OXYCODONE 5 MG/1
5 TABLET ORAL
Qty: 40 | Refills: 0 | Status: ACTIVE | COMMUNITY
Start: 2023-11-22 | End: 1900-01-01

## 2023-12-05 ENCOUNTER — RESULT REVIEW (OUTPATIENT)
Age: 73
End: 2023-12-05

## 2023-12-05 ENCOUNTER — APPOINTMENT (OUTPATIENT)
Dept: ORTHOPEDIC SURGERY | Facility: HOSPITAL | Age: 73
End: 2023-12-05
Payer: MEDICARE

## 2023-12-05 PROCEDURE — 23472 RECONSTRUCT SHOULDER JOINT: CPT | Mod: RT

## 2023-12-05 PROCEDURE — 23472 RECONSTRUCT SHOULDER JOINT: CPT | Mod: AS,RT

## 2023-12-12 ENCOUNTER — TRANSCRIPTION ENCOUNTER (OUTPATIENT)
Age: 73
End: 2023-12-12

## 2023-12-13 ENCOUNTER — APPOINTMENT (OUTPATIENT)
Dept: ORTHOPEDIC SURGERY | Facility: CLINIC | Age: 73
End: 2023-12-13
Payer: MEDICARE

## 2023-12-13 PROCEDURE — 99024 POSTOP FOLLOW-UP VISIT: CPT

## 2023-12-13 PROCEDURE — 73030 X-RAY EXAM OF SHOULDER: CPT | Mod: RT

## 2023-12-13 RX ORDER — OXYCODONE 5 MG/1
5 TABLET ORAL
Qty: 30 | Refills: 0 | Status: ACTIVE | COMMUNITY
Start: 2023-12-13 | End: 1900-01-01

## 2023-12-13 NOTE — HISTORY OF PRESENT ILLNESS
[de-identified] : 71 y/o female who underwent left reverse total shoulder arthroplasty on 3/7/22 with Dr. Oconnor. She reports excellent recovery and had excellent ROM with minimal pain. Patient admit to recent fall last Friday and landed on her shoulder. She has had severe pain since the fall. ROM is limited secondary to the fall. Patient was treated at [facility] with negative x-rays. Pain is noted to the superior aspect of the shoulder.  2/1/23: Patient presents today for repeat evaluation of left shoulder pain status post fall that occurred 2 weeks ago.  She has noticed less discomfort to the superior aspect of her shoulder.  Range of motion has slowly began to improve.  Patient has been applying topical Voltaren gel and ice. 3/1/23: 71 y/o F presenting for a follow up eval of left shoulder pain s/p fall 6 weeks ago. She states she is improved since last visit, but not back to baseline. Chief complaint is left shoulder pain in the posterior aspect, but tolerable. ROM is gradually improving. She reports recent increased right shoulder pain (diffuse, but most prominent in the anterior region). Episodic radiation of the right shoulder. 4/3/23 - upcoming knee surgery 05/10/23:Patient presenting for a follow up of left shoulder pain. Pt had minimal relief from subacromial inj. Wishes to proceed with orthvisc injections.   5/17/23: Pt here for a follow up of  right shoulder pain. Pt presents for orthovisc #2. Pt had minimal relief from prior injection.  5/24/23: Pt here for a follow up of right  shoulder pain. Pt presents for orthovisc #3. Pt had minimal relief from prior injection.  7/19/23: Patient here for right shoulder pain. Prior orthovisc injections provided no relief.  Pt would like to discuss definitive treatment. Pt has lymphedema in the right shoulder.   11/22/23: Patient returns today for repeat evaluation of ongoing right shoulder pain.  At this point pain is constant and severe.  She has failed all conservative management.  Patient wishes to discuss surgery. 12/13/23: Patient presents for   Acute febrile illness

## 2023-12-13 NOTE — PHYSICAL EXAM
[de-identified] : Constitutional: The general appearance of the patient is well developed, well nourished, no deformities and well groomed. Normal  Gait: Gait and function is as follows: normal gait.  Skin: Head and neck visualized skin is normal. Left upper extremity visualized skin is normal. Right upper extremity visualized skin is normal. Thoracic Skin of the thoracic spine shows visualized skin is normal.  Cardiovascular: palpable radial pulse bilaterally, good capillary refill in digits of the bilateral upper extremities and no temperature or color changes in the bilateral upper extremities.  Lymphatic: Normal Palpation of lymph nodes in the cervical.  Neurologic: fine motor control in the bilateral upper extremities is intact. Deep Tendon Reflexes in Upper and Lower Extremities Negative Werner's in the bilateral upper extremities. The patient is oriented to time, place and person. Sensation to light touch intact in the bilateral upper extremities. Mood and Affect is normal.  Right Shoulder: Inspection of the shoulder/upper arm is as follows: no scapula winging, no biceps deformity and no AC joint deformity. Palpation of the shoulder/upper arm is as follows: There is tenderness at the proximal biceps tendon. Range of motion of the shoulder is as follows: Pain with internal rotation, external rotation, abduction and forward flexion. Strength of the shoulder is as follows: Supraspinatus 4/5. External Rotation 4/5. Internal Rotation 4/5. Deltoid 5/5 Ligament Stability and Special Tests of the shoulder is as follows: Neer test is positive. Elmore' test is positive. Speed's test is positive  Left Shoulder:  Inspection of the shoulder/upper arm is as follows: Stable shoulder. Palpation of the shoulder/upper arm is as follows: There is mild diffuse tenderness . Range of motion of the shoulder is as follows: Limited secondary to immobilization/surgery. Strength of the shoulder is as follows:  Deltoid 5/5 Ligament Stability and Special Tests of the shoulder is as follows: Stable shoulder Incisions benign, no erythema/ swelling.  Neck:  Inspection / Palpation of the cervical spine is as follows: mild paracervical tenderness. Range of motion of the cervical spine is as follows: moderately decreased range of motion of the cervical spine. Stability testing for the cervical spine is as follows Stable range of motion.  Back, including spine: Inspection / Palpation of the thoracic/lumbar spine is as follows: There is a full, pain free, stable range of motion of the thoracic spine with a normal tone and not tenderness to palpation..  [Left] : left shoulder [There are no fractures, subluxations or dislocations. No significant abnormalities are seen] : There are no fractures, subluxations or dislocations. No significant abnormalities are seen [Components well fixed, in good position] : Components well fixed, in good position

## 2023-12-13 NOTE — DISCUSSION/SUMMARY
[de-identified] : This is a 72 yo female  days s/p right reverse total shoulder replacement, and biceps tenodesis.  patient is doing well, pain is controlled.   Incision is healing well with no evidence of infection or hematoma xrays today demonstrates excellent overall alignment.  Patient was provided PT prescription according to reverse protocol. They will start in 3-4 weeks.  Continue with sling immobilizer x 1 week Continue elbow,wrist,hand motion.  Patient will follow up in 1 month.

## 2024-01-10 ENCOUNTER — APPOINTMENT (OUTPATIENT)
Dept: ORTHOPEDIC SURGERY | Facility: CLINIC | Age: 74
End: 2024-01-10
Payer: MEDICARE

## 2024-01-10 DIAGNOSIS — M75.51 BURSITIS OF RIGHT SHOULDER: ICD-10-CM

## 2024-01-10 PROCEDURE — 73030 X-RAY EXAM OF SHOULDER: CPT | Mod: RT

## 2024-01-10 PROCEDURE — 99024 POSTOP FOLLOW-UP VISIT: CPT

## 2024-01-10 NOTE — HISTORY OF PRESENT ILLNESS
[de-identified] : 71 y/o female who underwent left reverse total shoulder arthroplasty on 3/7/22 with Dr. Oconnor. She reports excellent recovery and had excellent ROM with minimal pain. Patient admit to recent fall last Friday and landed on her shoulder. She has had severe pain since the fall. ROM is limited secondary to the fall. Patient was treated at [facility] with negative x-rays. Pain is noted to the superior aspect of the shoulder.  2/1/23: Patient presents today for repeat evaluation of left shoulder pain status post fall that occurred 2 weeks ago.  She has noticed less discomfort to the superior aspect of her shoulder.  Range of motion has slowly began to improve.  Patient has been applying topical Voltaren gel and ice. 3/1/23: 71 y/o F presenting for a follow up eval of left shoulder pain s/p fall 6 weeks ago. She states she is improved since last visit, but not back to baseline. Chief complaint is left shoulder pain in the posterior aspect, but tolerable. ROM is gradually improving. She reports recent increased right shoulder pain (diffuse, but most prominent in the anterior region). Episodic radiation of the right shoulder. 4/3/23 - upcoming knee surgery 05/10/23:Patient presenting for a follow up of left shoulder pain. Pt had minimal relief from subacromial inj. Wishes to proceed with orthvisc injections.   5/17/23: Pt here for a follow up of  right shoulder pain. Pt presents for orthovisc #2. Pt had minimal relief from prior injection.  5/24/23: Pt here for a follow up of right  shoulder pain. Pt presents for orthovisc #3. Pt had minimal relief from prior injection.  7/19/23: Patient here for right shoulder pain. Prior orthovisc injections provided no relief.  Pt would like to discuss definitive treatment. Pt has lymphedema in the right shoulder.   11/22/23: Patient returns today for repeat evaluation of ongoing right shoulder pain.  At this point pain is constant and severe.  She has failed all conservative management.  Patient wishes to discuss surgery. 12/13/23: Patient presents 8 days s/p right reverse total shoulder replacement and biceps tenodesis. Patient is doing well, pain is controlled. Patient has been compliant with the brace. Denies any fever, chills, drainage. 1/10/24: Patient presents 5 weeks s/p right reverse shoulder replacement. Pt doing well and is happy with her progress

## 2024-01-10 NOTE — PHYSICAL EXAM
[Left] : left shoulder [There are no fractures, subluxations or dislocations. No significant abnormalities are seen] : There are no fractures, subluxations or dislocations. No significant abnormalities are seen [Components well fixed, in good position] : Components well fixed, in good position [de-identified] : Constitutional: The general appearance of the patient is well developed, well nourished, no deformities and well groomed. Normal  Gait: Gait and function is as follows: normal gait.  Skin: Head and neck visualized skin is normal. Left upper extremity visualized skin is normal. Right upper extremity visualized skin is normal. Thoracic Skin of the thoracic spine shows visualized skin is normal.  Cardiovascular: palpable radial pulse bilaterally, good capillary refill in digits of the bilateral upper extremities and no temperature or color changes in the bilateral upper extremities.  Lymphatic: Normal Palpation of lymph nodes in the cervical.  Neurologic: fine motor control in the bilateral upper extremities is intact. Deep Tendon Reflexes in Upper and Lower Extremities Negative Werner's in the bilateral upper extremities. The patient is oriented to time, place and person. Sensation to light touch intact in the bilateral upper extremities. Mood and Affect is normal.  Right Shoulder: Inspection of the shoulder/upper arm is as follows: no scapula winging, no biceps deformity and no AC joint deformity. Palpation of the shoulder/upper arm is as follows: There is tenderness at the proximal biceps tendon. Range of motion of the shoulder is as follows: Pain with internal rotation, external rotation, abduction and forward flexion. Strength of the shoulder is as follows: Supraspinatus 4/5. External Rotation 4/5. Internal Rotation 4/5. Deltoid 5/5 Ligament Stability and Special Tests of the shoulder is as follows: Neer test is positive. Elmore' test is positive. Speed's test is positive  Left Shoulder:  Inspection of the shoulder/upper arm is as follows: Stable shoulder. Palpation of the shoulder/upper arm is as follows: There is mild diffuse tenderness . Range of motion of the shoulder is as follows: Limited secondary to immobilization/surgery. Strength of the shoulder is as follows:  Deltoid 5/5 Ligament Stability and Special Tests of the shoulder is as follows: Stable shoulder Incisions benign, no erythema/ swelling.  Neck:  Inspection / Palpation of the cervical spine is as follows: mild paracervical tenderness. Range of motion of the cervical spine is as follows: moderately decreased range of motion of the cervical spine. Stability testing for the cervical spine is as follows Stable range of motion.  Back, including spine: Inspection / Palpation of the thoracic/lumbar spine is as follows: There is a full, pain free, stable range of motion of the thoracic spine with a normal tone and not tenderness to palpation..

## 2024-01-10 NOTE — DISCUSSION/SUMMARY
[de-identified] : This is a 74 yo female 5 weeks s/p right reverse total shoulder replacement, and biceps tenodesis.  patient is doing well, pain is controlled.   xrays today demonstrates excellent overall alignment.  Patient was provided PT prescription according to reverse protocol.  Continue elbow,wrist,hand motion.  Scar strips recommended for scar healing  Patient will follow up 3 months   (1) We discussed a comprehensive treatment plans that included a prescription management plan involving the use of prescription strength medications to include Ibuprofen 600-800 mg TID, versus 500-650 mg Tylenol. We also discussed prescribing topical diclofenac (Voltaren gel) as well as once daily Meloxicam 15 mg. (2) The patient has More Than One chronic injuries/illnesses as outlined, discussed, and documented by ICD 10 codes listed, as well as the HPI and Plan section. There is a moderate risk of morbidity with further treatment, especially from use of prescription strength medications and possible side effects of these medications which include upset stomach and cardiac/renal issues with long term use were discussed. (3) I recommended that the patient follow-up with their medical physician to discuss any significant specific potential issues with long term use such as interactions with current medications or with exacerbation of underlying medical morbidities.   Attestation: I, Christina Leung , attest that this documentation has been prepared under the direction and in the presence of Provider Baldo Branham MD. The documentation recorded by the scribe, in my presence, accurately reflects the service I personally performed, and the decisions made by me with my edits as appropriate. Baldo Branham MD

## 2024-04-17 ENCOUNTER — APPOINTMENT (OUTPATIENT)
Dept: ORTHOPEDIC SURGERY | Facility: CLINIC | Age: 74
End: 2024-04-17
Payer: MEDICARE

## 2024-04-17 DIAGNOSIS — M19.011 PRIMARY OSTEOARTHRITIS, RIGHT SHOULDER: ICD-10-CM

## 2024-04-17 DIAGNOSIS — Z96.612 PRESENCE OF LEFT ARTIFICIAL SHOULDER JOINT: ICD-10-CM

## 2024-04-17 DIAGNOSIS — Z96.611 PRESENCE OF RIGHT ARTIFICIAL SHOULDER JOINT: ICD-10-CM

## 2024-04-17 PROCEDURE — 99214 OFFICE O/P EST MOD 30 MIN: CPT

## 2024-04-17 PROCEDURE — 73030 X-RAY EXAM OF SHOULDER: CPT | Mod: RT

## 2024-04-17 NOTE — HISTORY OF PRESENT ILLNESS
[de-identified] : 73 y/o female who underwent left reverse total shoulder arthroplasty on 3/7/22 with Dr. Oconnor. She reports excellent recovery and had excellent ROM with minimal pain. Patient admit to recent fall last Friday and landed on her shoulder. She has had severe pain since the fall. ROM is limited secondary to the fall. Patient was treated at [facility] [place] with negative x-rays. Pain is noted to the superior aspect of the shoulder.  2/1/23: Patient presents today for repeat evaluation of left shoulder pain status post fall that occurred 2 weeks ago.  She has noticed less discomfort to the superior aspect of her shoulder.  Range of motion has slowly began to improve.  Patient has been applying topical Voltaren gel and ice. 3/1/23: 73 y/o F presenting for a follow up eval of left shoulder pain s/p fall 6 weeks ago. She states she is improved since last visit, but not back to baseline. Chief complaint is left shoulder pain in the posterior aspect, but tolerable. ROM is gradually improving. She reports recent increased right shoulder pain (diffuse, but most prominent in the anterior region). Episodic radiation of the right shoulder. 4/3/23 - upcoming knee surgery 05/10/23:Patient presenting for a follow up of left shoulder pain. Pt had minimal relief from subacromial inj. Wishes to proceed with orthvisc injections.   5/17/23: Pt here for a follow up of  right shoulder pain. Pt presents for orthovisc #2. Pt had minimal relief from prior injection.  5/24/23: Pt here for a follow up of right  shoulder pain. Pt presents for orthovisc #3. Pt had minimal relief from prior injection.  7/19/23: Patient here for right shoulder pain. Prior orthovisc injections provided no relief.  Pt would like to discuss definitive treatment. Pt has lymphedema in the right shoulder.   11/22/23: Patient returns today for repeat evaluation of ongoing right shoulder pain.  At this point pain is constant and severe.  She has failed all conservative management.  Patient wishes to discuss surgery. 12/13/23: Patient presents 8 days s/p right reverse total shoulder replacement and biceps tenodesis. Patient is doing well, pain is controlled. Patient has been compliant with the brace. Denies any fever, chills, drainage. 1/10/24: Patient presents 5 weeks s/p right reverse shoulder replacement. Pt doing well and is happy with her progress 4/17/24: Patient presents 4 months s/p right reverse shoulder replacement. Pt has minimal pain with trying to get her hand behind her head to do her hair

## 2024-04-17 NOTE — PHYSICAL EXAM
[Left] : left shoulder [There are no fractures, subluxations or dislocations. No significant abnormalities are seen] : There are no fractures, subluxations or dislocations. No significant abnormalities are seen [Components well fixed, in good position] : Components well fixed, in good position [de-identified] : Constitutional: The general appearance of the patient is well developed, well nourished, no deformities and well groomed. Normal  Gait: Gait and function is as follows: normal gait.  Skin: Head and neck visualized skin is normal. Left upper extremity visualized skin is normal. Right upper extremity visualized skin is normal. Thoracic Skin of the thoracic spine shows visualized skin is normal.  Cardiovascular: palpable radial pulse bilaterally, good capillary refill in digits of the bilateral upper extremities and no temperature or color changes in the bilateral upper extremities.  Lymphatic: Normal Palpation of lymph nodes in the cervical.  Neurologic: fine motor control in the bilateral upper extremities is intact. Deep Tendon Reflexes in Upper and Lower Extremities Negative Werner's in the bilateral upper extremities. The patient is oriented to time, place and person. Sensation to light touch intact in the bilateral upper extremities. Mood and Affect is normal.  Right Shoulder: Inspection of the shoulder/upper arm is as follows: no scapula winging, no biceps deformity and no AC joint deformity. Palpation of the shoulder/upper arm is as follows: There is tenderness at the proximal biceps tendon. Range of motion of the shoulder is as follows: Pain with internal rotation, external rotation, abduction and forward flexion. Strength of the shoulder is as follows: Supraspinatus 4/5. External Rotation 4/5. Internal Rotation 4/5. Deltoid 5/5 Ligament Stability and Special Tests of the shoulder is as follows: Neer test is positive. Elmore' test is positive. Speed's test is positive  Left Shoulder:  Inspection of the shoulder/upper arm is as follows: Stable shoulder. Palpation of the shoulder/upper arm is as follows: There is mild diffuse tenderness . Range of motion of the shoulder is as follows: Limited secondary to immobilization/surgery. Strength of the shoulder is as follows:  Deltoid 5/5 Ligament Stability and Special Tests of the shoulder is as follows: Stable shoulder Incisions benign, no erythema/ swelling.  Neck:  Inspection / Palpation of the cervical spine is as follows: mild paracervical tenderness. Range of motion of the cervical spine is as follows: moderately decreased range of motion of the cervical spine. Stability testing for the cervical spine is as follows Stable range of motion.  Back, including spine: Inspection / Palpation of the thoracic/lumbar spine is as follows: There is a full, pain free, stable range of motion of the thoracic spine with a normal tone and not tenderness to palpation..

## 2024-04-17 NOTE — DISCUSSION/SUMMARY
[de-identified] : This is a 74 yo female 4 months s/p right reverse total shoulder replacement, and biceps tenodesis.  patient is doing well, pain is controlled.   xrays today demonstrates excellent overall alignment.  Continue elbow,wrist,hand motion.  Patient will follow up at year of anniversary of surgery for repeat x-rays   (1) We discussed a comprehensive treatment plans that included a prescription management plan involving the use of prescription strength medications to include Ibuprofen 600-800 mg TID, versus 500-650 mg Tylenol. We also discussed prescribing topical diclofenac (Voltaren gel) as well as once daily Meloxicam 15 mg. (2) The patient has More Than One chronic injuries/illnesses as outlined, discussed, and documented by ICD 10 codes listed, as well as the HPI and Plan section. There is a moderate risk of morbidity with further treatment, especially from use of prescription strength medications and possible side effects of these medications which include upset stomach and cardiac/renal issues with long term use were discussed. (3) I recommended that the patient follow-up with their medical physician to discuss any significant specific potential issues with long term use such as interactions with current medications or with exacerbation of underlying medical morbidities.   Attestation: I, Christina Leung , attest that this documentation has been prepared under the direction and in the presence of Provider Baldo Branham MD. The documentation recorded by the scribe, in my presence, accurately reflects the service I personally performed, and the decisions made by me with my edits as appropriate. Baldo Branham MD

## 2024-04-19 ENCOUNTER — NON-APPOINTMENT (OUTPATIENT)
Age: 74
End: 2024-04-19

## 2024-12-11 ENCOUNTER — APPOINTMENT (OUTPATIENT)
Dept: ORTHOPEDIC SURGERY | Facility: CLINIC | Age: 74
End: 2024-12-11
Payer: MEDICARE

## 2024-12-11 DIAGNOSIS — M75.51 BURSITIS OF RIGHT SHOULDER: ICD-10-CM

## 2024-12-11 DIAGNOSIS — Z96.612 PRESENCE OF LEFT ARTIFICIAL SHOULDER JOINT: ICD-10-CM

## 2024-12-11 DIAGNOSIS — M25.512 PAIN IN LEFT SHOULDER: ICD-10-CM

## 2024-12-11 DIAGNOSIS — Z96.611 PRESENCE OF RIGHT ARTIFICIAL SHOULDER JOINT: ICD-10-CM

## 2024-12-11 PROCEDURE — 73030 X-RAY EXAM OF SHOULDER: CPT | Mod: RT

## 2024-12-11 PROCEDURE — 99214 OFFICE O/P EST MOD 30 MIN: CPT

## 2024-12-11 PROCEDURE — 73010 X-RAY EXAM OF SHOULDER BLADE: CPT | Mod: RT

## 2025-02-01 ENCOUNTER — NON-APPOINTMENT (OUTPATIENT)
Age: 75
End: 2025-02-01

## 2025-02-12 ENCOUNTER — APPOINTMENT (OUTPATIENT)
Dept: ORTHOPEDIC SURGERY | Facility: CLINIC | Age: 75
End: 2025-02-12
Payer: MEDICARE

## 2025-02-12 DIAGNOSIS — Z96.612 PRESENCE OF LEFT ARTIFICIAL SHOULDER JOINT: ICD-10-CM

## 2025-02-12 PROCEDURE — 73010 X-RAY EXAM OF SHOULDER BLADE: CPT | Mod: LT

## 2025-02-12 PROCEDURE — 73030 X-RAY EXAM OF SHOULDER: CPT | Mod: LT

## 2025-02-12 PROCEDURE — 99214 OFFICE O/P EST MOD 30 MIN: CPT

## 2025-02-12 RX ORDER — LIDOCAINE 5% 700 MG/1
5 PATCH TOPICAL
Qty: 1 | Refills: 0 | Status: ACTIVE | COMMUNITY
Start: 2025-02-12 | End: 1900-01-01